# Patient Record
(demographics unavailable — no encounter records)

---
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
## 2018-02-22 NOTE — NUR
PT PRESENTED TO THE ER WITH A C/O "I WANT A SECOND OPINION ON MY MEDICATION, I 
DON'T TRUST THEM". PT IS PLEASANT AND COOPERATIVE.  AT THE BEDSIDE FOR 
BLOOD DRAW. PT IS TRYING TO GIVE A URINE SAMPLE.

## 2018-02-23 NOTE — NUR
WOUND CARE CONSULT: PT PRESENTS WITH RASH TO BUTTOCKS, PRESENT ON ADMISSION. PT ALSO HAS 
SOME SPOTTY RASH TO BACK AND REDNESS TO FACE. DEFER TO MD FOR BACK AND FACE. RECOMMENDATIONS 
MADE FOR BUTTOCK RASH AND DISCUSSED WITH NURSING STAFF. PT IS AMBULATORY AND MOSTLY 
CONTINENT. WILL SEE PRN. MD IN AGREEMENT WITH PLAN OF CARE. 

-------------------------------------------------------------------------------

Addendum: 02/23/18 at 1137 by AKOSUA AC WNDNU

-------------------------------------------------------------------------------

Amended: Links added.

## 2018-02-23 NOTE — NUR
Initial Discharge Plan:



Pt reports living in a shelter in [facility]. Pt was unable to provide the complete address. 
Address on face sheet is [address] [address]; [phone]. [place] 
in [place]. SW will follow up to ensure pt is safely and adequately discharged.

## 2018-02-23 NOTE — NUR
PAGED AND SPOKE TO DR. HERRERA REGARDING THE MED RECON. DR. HERRERA SAID THE MORNING DOCTOR WILL DO 
IT. WILL ENDORSE TO NEXT SHIFT NURSE FOR FOLLOW UP.

## 2018-02-26 NOTE — NUR
GPS RN NOTE, RECEIVED PATIENT AWAKE AND IN BED, NO S/S OR COMPLAINTS OF PAIN AT THIS TIME.  
PATIENT IS DISPLAYING NO S/S OF APPARENT DISTRESS AT THIS TIME.  PATIENT BREATHING IS 
UNLABORED WITH EQUAL RISE AND FALL OF THE CHEST.  PATIENT IS ALERT AND ORIENTED X 2 ON ROOM 
AIR WITH A SPO2 OF 99%.  PATIENT IS MEDICATION COMPLIANT, CONFUSED AT TIMES, DISORGANIZED, 
ANXIOUS, PARANOID, AND NEEDS REORIENTATION.  PATIENT DENIES SUICIDE IDEATIONS AND HOMICIDAL 
IDEATIONS AT THIS TIME.  PATIENT ASSISTED WITH TURNING AND REPOSITIONING Q2HR AND PRN FOR 
COMFORT AND CIRCULATION.  PATIENT HAS NO NEEDS AT THIS TIME.  PATIENT EDUCATED ON THE USE OF 
THE CALL BELL.  PATIENT SIDE RAILS ARE UP X 2, BED IS LOCKED AND LOW, AND I WILL CONTINUE TO 
MONITOR THIS PATIENT Q 15 MIN WITH THE HELP OF STAFF.

## 2018-02-27 NOTE — NUR
GPS RN NOTE, PATIENT HAS A COMPLAINT OF COUGH AND IS REQUESTING TESSALON PERLE.  PATIENT 
LUNG ARE CLEAR TO AUSCULTATION.  PATIENT VITAL SIGNS ARE STABLE.  GAVE TESSALON PERLE 1 
100MG TAB  PO Q4HR PRN AS ORDERED.  WILL CONTINUE TO MONITOR THIS PATIENT.

## 2018-02-27 NOTE — NUR
Discharge Planning:



CALE faxed inquiry to [facility], [address] [place] [address]; [phone] / fax number [phone]. CALE to follow up on status of referral. 

-------------------------------------------------------------------------------

Addendum: 03/01/18 at 0931 by MARGARITA MADSEN

-------------------------------------------------------------------------------

Patient was accepted to [facility]. Discharge is set for Friday 3/2.

## 2018-02-28 NOTE — NUR
RNLebronCO: RISPERDAL 2.5 WAS GIVEN AT THIS TIME INSTED OF 9:00 AM BECAUSE IT WAS OUT OF THE 
OMNICELL AT THOSE HOUR (BET 8:00 TO 10:00).

## 2018-03-01 NOTE — NUR
GPS/RN-NOTES

PATIENT WAS SEEN BY DR. CONTEH AND AWARE OF THE LAB RESULTS WITH  ORDER OF STOOL FOR OB. 
NO STOOL SPECIMEN COLLECTED THIS SHIFT. WILL ENDORSED TO THE INCOMING NURSE FOR THE 
CONTINUITY OF CARE AND STOOL COLLECTION.

## 2018-03-01 NOTE — NUR
Discharge planning:



CALE faxed progress notes to [facility], [address] [place] [address]. [place] [address]; 
[phone] / fax number [phone]

## 2018-03-02 NOTE — NUR
Discharge Note:



Patient will be discharged on Saturday 3/3/18 to [facility], [address] [place] [address]. [place] [address]; [phone] / fax number [phone] via ambulance 
transportation arranged by , trip #[id number]. Transportation is set for 12:30pm. Patient is 
in agreement with this plan and is glad to be going to [facility] and discharging from 
hospital. Patient has no family to notify.



Patient will be followed by internist, Dr. Cordova [address], [place] 
[address] [phone] at the facility. Patient will also be under the continued care of 
psychiatrist, Dr. Tapia [address] [address] [phone]

## 2018-03-02 NOTE — NUR
REPORT GIVEN TO MIRZA FOR CONTINUITY OF CARE. STOOL NEEDED  FOR OCCULT BLOOD TEST. PATIENT 
DID NOT MOVE HIS BOWEL YET TO THIS TIME.

## 2018-03-03 NOTE — NUR
DR. DOUGLAS GAVE AN ORDER TO D/C HOLD AND D/C TO [facility] AND TO FOLLOW UP WITH 
PSYCH AND MEDICAL DOCTORS. SPOKE TO CORNELIUS FROM THE FACILITY AND CONFIRMED THAT THEY WILL 
ACCEPT THE PT. TODAY.

## 2018-03-03 NOTE — NUR
GPS/RN PT D/C TO [facility] WITH REPORT GIVEN TO RAHAT CARVAJAL. PT IS AMBULATORY VSS NO 
ACUTE DISTRESS. NO SI OR HI AT THE TIME OF D/C. DISCHARGE TEACHING/EXIT CARE GIVEN AND 
UNDERSTOOD , MEDICATIONS, PROPERTY RETURNED. PT REFUSED PICTURES ON D/C. LEFT VIA AMBULANZ.

## 2021-04-30 NOTE — NUR
gay DEVINE frm snf for ct angiogram for noted sudden onset strabismus. On room air, 
breathing evenly and unlabored. Connected to the monitor and pulse ox. Kept 
comfortable, will continue to monitor accordingly.

## 2021-04-30 NOTE — NUR
RN ADMITTING NOTE



RECEIVED PATIENT FROM ER VIA GURNEY; ADMITTING DIAGNOSIS WEAKNESS, COVID PCR DONE, RESULT 
PENDING. PATIENT AWAKE, ALERT AND ORIENTED X3-4, WITH EPISODES OF CONFUSION. DENIES PAIN. IN 
NO S/SX OF ACUTE DISTRESS AT THIS TIME. NO SOB NOTED. PATIENT'S BREATHING IS EVEN AND 
UNLABORED, SATURATION % ON ROOM AIR, HR IS 73, SR ON THE MONITOR. NOTED IV SITE AT 
RIGHT AC 18G, PATENT AND FLUSHING WELL, SALINE LOCKED. NO S/S OF INFECTION OR INFILTRATION. 
SAFETY MEASURES IMPLEMENTED. PATIENT BED ALARM IS ON. HEAD OF BED ELEVATED. BED IS LOCKED,  
IN LOWEST POSITION AND SIDE RAILS UP. CALL LIGHT WITHIN REACH OF THE PATIENT. ISOLATION 
PRECAUTIONS IN PLACE. WILL CONTINUE TO MONITOR AND REASSESS FOR ANY CHANGES. WILL ATTEND TO 
ALL MD ADMITTING ORDERS.

## 2021-04-30 NOTE — NUR
RN NOTE



NOTED /83 ON ADMISSION. RECHECKED AFTER 30 MINS AND REVEALED 165/80. DR RANKIN WAS 
NOTIFIED, NO NEW ORDERS RECEIVED, STATED TO MONITOR FOR NOW SINCE BP IS GOING DOWN. CHARGE 
RN MADE AWARE

## 2021-05-01 NOTE — NUR
RN CLOSING NOTE

PATIENT IN BED.ALERT ORIENTED.NO SOB NO DISTRESS NOTED WITH STABLE VITAL SIGNS.PCR TEST 
STILL PENDING.ABLE TO MAKE NEEDS KNOWN.IV IS INTACT AND PATENT.USE URINAL.SAFETY MEASURES IN 
PLACE WILL ENDORSE TO PM NURSE FOR JOAQUINA.

## 2021-05-01 NOTE — NUR
RN NOTE

SEEN BY  UPDATED ABOUT PATIENT CONDITION WITH BP /87 AND MG 1.4.REQUESTED 
FOR NEW ORDERS .HE REPLIED HE WILL DO IT AFTER ROUNDS.AWAITING FOR ORDERS.DOCTOR SPOKE TO 
THE PATIENT ANSWERED ALL QUESTIONS AND CONCERNS.

## 2021-05-01 NOTE — NUR
RN NOTE



RECEIVED PT IN BED, ALERT AND ORIENTED X3. NO DISTRESS NOTED, DENIES SOB. ON ROOM AIR, O2 
SAT AT 97 %.  STRABISMUS STILL NOTED ON LEFT EYE. BP /89, PRN CLONIDINE NOT DUE YET. 
DENIES ANY CHEST PAIN OR DIZZINESS. IV ON RAC PATENT AND INTACT. WILL CONTINUE TO MONITOR. 
ALL SAFETY MEASURES IMPLEMENTED PER PROTOCOL, CALL LIGHT WITHIN REACH. BED LOCKED IN LOWEST 
POSITION. SIDE RAILS UP X 2. BED ALARM ON.

## 2021-05-01 NOTE — NUR
RECEIVED NEW ORDER FORM  FOR CLONIDINE 0.1MG Q6H PRN FOR SBP>160.WILL CONTINUE 
TO MONITOR.

-------------------------------------------------------------------------------

Addendum: 05/01/21 at 1433 by KIMI BENTLEY RN

-------------------------------------------------------------------------------

FOR SBP >150

## 2021-05-01 NOTE — NUR
TELE RN NOTE

RECEIVED REPORT FROM PM NURSE.PATIENT IN BED .ALERT ORIENTED .ABLE TO MAKE NEEDS KNOWN.NO 
SOB NO DISTRESS NOTED .ON ROOM AIR .IV ON R AC INTACT AND PATENT.ON TELE MONITOR SR WITH PAC 
HR 67.AWAITING PRIMARY CARE CONSULT.SAFETY MEASURES IN PLACE .BED ALARM ON .SRX2 UP.WILL 
CONTINUE TO MONITOR.

## 2021-05-01 NOTE — NUR
RN NOTE



NOTED /82. DR RANKIN WAS NOTIFIED. ORDER RECEIVED FOR HYDRALAZINE 10 MG IV TO BE GIVEN 
ONCE. CHARGE RN MADE AWARE.

## 2021-05-02 NOTE — NUR
MS RN NOTE 

 DVT PUMPS IN PLACED , HAVING  LUNCH , ABLE TO EAT SELF ,  ALL NEEDS ATTENDED ,USING URINAL 
FOR VOIDING

## 2021-05-02 NOTE — NUR
MS  RN NOTE 

PATIENT IN BED , ALL NEEDS ATTENDED, ALERT ORIENTED  ABLE TO MAKE NEEDS KNOWN,, ON RA NO SOB 
NOTED AT THIS TIME, RT AC HL INTACT , BED IN LOWEST AND LOCKED POSITION, CALL LIGHT WITHIN 
REACH ,PLAN OF CARE DISCUSSED WITH PATIENT,, WILL CONT TO MONITOR

## 2021-05-02 NOTE — NUR
RN NOTE



PT REMAIN BED. BREATHING EVEN AND UNLABORED. NOT IN ANY DISTRESS. ABLE TO MAKE NEEDS KNOWN. 
AMBULATES TO RESTROOM. NO S/SX OF HYPERTENSION, LATEST /71. NO PAIN NOTED. ALL SAFETY 
MEASURES MAINTAINED. WILL ENDORSE TO NEXT SHIFT NURSE FOR JOAQUINA.

## 2021-05-02 NOTE — NUR
MS RN NOTE

 PATIENT IN BED , ALL NEEDS ATTENDED ,HAVING DINNER , ABLE TO EAT SELF ,NO SOB NOTED, NO C\O 
DIZZINESS OR HEADACHE , CALL LIGHT WITHIN REACH, WILL CONT TO MONITOR CLOSELY

## 2021-05-02 NOTE — NUR
MS RN NOTE 

 UP ON CHAIR , ALL NEEDS ATTENDED KEEP CLEAN DRY , CALL LIGHT WITHIN REACH, WILL CONT TO 
MONITOR, NO DIZZINESS OR HEADACHE NOTED

## 2021-05-02 NOTE — NUR
MS RN NOTE

PT IN BED AWAKE. A/O X 2, CONFUSED AT TIMES. NO SOB, NO DISTRESS OR DISCOMFORT NOTED. DENIES 
PAIN. RAC S/L # 18 INTACT AND PATENT, SIDE RAILS UP X 2 AND CALL LIGHT WITHIN REACH. 
CONTINUE TO MONITOR HIM.

## 2021-05-03 NOTE — NUR
MS RN NOTE



RECEIVED PT IN BED, ALERT AND ORIENTED X3. NO DISTRESS NOTED, DENIES SOB. ON ROOM AIR, O2 
SAT AT 96 %. RESPIRATION UNLABORED. STRABISMUS STILL NOTED ON LEFT EYE. DENIES ANY CHEST 
PAIN OR DIZZINESS. IV ON RAC, FLUSHES WELL, SITE CLEAR. AMBULATORY, NO SKIN ISSUES. POC 
DISCUSSED, NEEDS FURTHER TEACHINGS. WILL CONTINUE TO MONITOR. ALL SAFETY MEASURES 
IMPLEMENTED PER PROTOCOL, CALL LIGHT WITHIN REACH. BED LOCKED IN LOWEST POSITION. SIDE RAILS 
UP X 2. BED ALARM ON.

## 2021-05-03 NOTE — NUR
MS RN NOTE

PT IN BED ASLEEP, AROUSABLE. NO DISTRESS OR DISCOMFORT DURING THE SHIFT. PT DENIES ANY PAIN. 
ALL NEEDS ATTENDED. SIDE RAILS UP X 2 AND CALL LIGHT WITHIN REACH. WILL ENDORSE TO DAY SHIFT 
NURSE FOR CONTINUE TO CARE.

## 2021-05-03 NOTE — NUR
RN NOTE



PATIENT IN BED ALERT AND ORIENTED X2 WITH PERIODS OF CONFUSION. ON ROOM AIR O2 SAT 98%, NO 
S/S OF RESPIRATORY DISTRESS OR SOB. DENIES ANY PAIN. WITH RIGHT AC #18, PATENT AND INTACT. 
FLUSHED WITH NS. ALL NEEDS ANTICIPATED. BED LOCKED AND IN LOWEST POSITION, SIDE RAILS UP X2. 
CALL LIGHT WITHIN REACH. WILL CONTINUE TO MONITOR.

## 2021-05-03 NOTE — NUR
MS RN CLOSING NOTE



PT IN BED, ALERT AND ORIENTED X3. RESTING. NO DISTRESS NOTED, DENIES SOB. ON ROOM AIR, O2 
SAT  %. RESPIRATION UNLABORED. STRABISMUS STILL NOTED ON LEFT EYE. DENIES ANY CHEST 
PAIN OR DIZZINESS. IV ON RAC, FLUSHES WELL, SITE CLEAR. AMBULATORY, NO SKIN ISSUES. ALL 
NEEDS MET, PM CARE DONE. ALL SAFETY MEASURES IMPLEMENTED PER PROTOCOL, CALL LIGHT WITHIN 
REACH. BED LOCKED IN LOWEST POSITION. SIDE RAILS UP X 2. BED ALARM ON. WILL ENDORSE TO NEXT 
SHIFT FOR JOAQUINA.

## 2021-05-04 NOTE — NUR
RN OPENING NOTE



PATIENT RECEIVED IN BED RESTING IN RIGHT LATERAL POSITION ALERT AND ORIENTED X2 WITH PERIODS 
OF CONFUSION. PATIENT IS ON ROOM AIR WITH NO S/S OF RESPIRATORY DISTRESS OR SOB, TOLERATING 
WELL. PATIENT DENIES ANY PAIN. RIGHT AC #18 PATENT AND INTACT. FLUSHED WITH NS. SAFETY 
PRECAUTIONS IMPLEMENTED, BED LOCKED AND IN LOWEST POSITION, SIDE RAILS UP X2, CALL LIGHT 
WITHIN REACH. WILL CONTINUE TO MONITOR AND PROVIDE CARE THROUGHOUT SHIFT.

## 2021-05-04 NOTE — NUR
patient discharged from hospital in stable condition. right eye covered with gauze per 
verbal orders from neurologist. discharge paperwork and instructions provided for client 
prior to transport. care transferred over to ambulance for transportation to [facility]. 
called and gave report to Nini THOMAS.

## 2021-05-04 NOTE — NUR
RN NOTE



PATIENT IN BED ALERT AND ORIENTED X2 WITH PERIODS OF CONFUSION. ON ROOM AIR O2 SAT 98%, NO 
SOB. DENIES ANY PAIN. WITH RIGHT AC #18, PATENT AND INTACT. FLUSHED WITH NS. NO SIGNIFICANT 
CHANGES DURING THIS SHIFT. ALL DUE MEDS GIVEN AS ORDERED AND TOLERATED WELL. BED LOCKED AND 
IN LOWEST POSITION, SIDE RAILS UP X2. CALL LIGHT WITHIN REACH. WILL ENDORSE TO AM SHIFT.